# Patient Record
Sex: MALE | Race: NATIVE HAWAIIAN OR OTHER PACIFIC ISLANDER | NOT HISPANIC OR LATINO | URBAN - METROPOLITAN AREA
[De-identification: names, ages, dates, MRNs, and addresses within clinical notes are randomized per-mention and may not be internally consistent; named-entity substitution may affect disease eponyms.]

---

## 2024-08-09 ENCOUNTER — HOSPITAL ENCOUNTER (EMERGENCY)
Facility: HOSPITAL | Age: 44
Discharge: HOME | End: 2024-08-09
Attending: EMERGENCY MEDICINE

## 2024-08-09 VITALS
WEIGHT: 127.88 LBS | DIASTOLIC BLOOD PRESSURE: 70 MMHG | TEMPERATURE: 98 F | RESPIRATION RATE: 16 BRPM | OXYGEN SATURATION: 99 % | HEIGHT: 67 IN | SYSTOLIC BLOOD PRESSURE: 132 MMHG | BODY MASS INDEX: 20.07 KG/M2 | HEART RATE: 69 BPM

## 2024-08-09 DIAGNOSIS — R10.9 ABDOMINAL PAIN: ICD-10-CM

## 2024-08-09 DIAGNOSIS — K59.00 CONSTIPATION, UNSPECIFIED CONSTIPATION TYPE: Primary | ICD-10-CM

## 2024-08-09 LAB
ALBUMIN SERPL BCP-MCNC: 4 G/DL (ref 3.5–5.2)
ALP SERPL-CCNC: 54 U/L (ref 55–135)
ALT SERPL W/O P-5'-P-CCNC: 18 U/L (ref 10–44)
ANION GAP SERPL CALC-SCNC: 6 MMOL/L (ref 8–16)
AST SERPL-CCNC: 17 U/L (ref 10–40)
BASOPHILS # BLD AUTO: 0.06 K/UL (ref 0–0.2)
BASOPHILS NFR BLD: 0.8 % (ref 0–1.9)
BILIRUB SERPL-MCNC: 0.3 MG/DL (ref 0.1–1)
BILIRUB UR QL STRIP: NEGATIVE
BUN SERPL-MCNC: 13 MG/DL (ref 6–20)
CALCIUM SERPL-MCNC: 9.3 MG/DL (ref 8.7–10.5)
CHLORIDE SERPL-SCNC: 105 MMOL/L (ref 95–110)
CLARITY UR REFRACT.AUTO: CLEAR
CO2 SERPL-SCNC: 26 MMOL/L (ref 23–29)
COLOR UR AUTO: COLORLESS
CREAT SERPL-MCNC: 1.1 MG/DL (ref 0.5–1.4)
DIFFERENTIAL METHOD BLD: NORMAL
EOSINOPHIL # BLD AUTO: 0.1 K/UL (ref 0–0.5)
EOSINOPHIL NFR BLD: 1.7 % (ref 0–8)
ERYTHROCYTE [DISTWIDTH] IN BLOOD BY AUTOMATED COUNT: 12.7 % (ref 11.5–14.5)
EST. GFR  (NO RACE VARIABLE): >60 ML/MIN/1.73 M^2
GLUCOSE SERPL-MCNC: 96 MG/DL (ref 70–110)
GLUCOSE UR QL STRIP: NEGATIVE
HCT VFR BLD AUTO: 44.5 % (ref 40–54)
HCV AB SERPL QL IA: NORMAL
HGB BLD-MCNC: 14.4 G/DL (ref 14–18)
HGB UR QL STRIP: NEGATIVE
HIV 1+2 AB+HIV1 P24 AG SERPL QL IA: NORMAL
IMM GRANULOCYTES # BLD AUTO: 0.01 K/UL (ref 0–0.04)
IMM GRANULOCYTES NFR BLD AUTO: 0.1 % (ref 0–0.5)
KETONES UR QL STRIP: NEGATIVE
LEUKOCYTE ESTERASE UR QL STRIP: NEGATIVE
LIPASE SERPL-CCNC: 27 U/L (ref 4–60)
LYMPHOCYTES # BLD AUTO: 3 K/UL (ref 1–4.8)
LYMPHOCYTES NFR BLD: 40 % (ref 18–48)
MCH RBC QN AUTO: 28.4 PG (ref 27–31)
MCHC RBC AUTO-ENTMCNC: 32.4 G/DL (ref 32–36)
MCV RBC AUTO: 88 FL (ref 82–98)
MONOCYTES # BLD AUTO: 0.6 K/UL (ref 0.3–1)
MONOCYTES NFR BLD: 7.8 % (ref 4–15)
NEUTROPHILS # BLD AUTO: 3.7 K/UL (ref 1.8–7.7)
NEUTROPHILS NFR BLD: 49.6 % (ref 38–73)
NITRITE UR QL STRIP: NEGATIVE
NRBC BLD-RTO: 0 /100 WBC
PH UR STRIP: 7 [PH] (ref 5–8)
PLATELET # BLD AUTO: 283 K/UL (ref 150–450)
PMV BLD AUTO: 9.2 FL (ref 9.2–12.9)
POTASSIUM SERPL-SCNC: 4.5 MMOL/L (ref 3.5–5.1)
PROT SERPL-MCNC: 6.9 G/DL (ref 6–8.4)
PROT UR QL STRIP: NEGATIVE
RBC # BLD AUTO: 5.07 M/UL (ref 4.6–6.2)
SODIUM SERPL-SCNC: 137 MMOL/L (ref 136–145)
SP GR UR STRIP: >1.03 (ref 1–1.03)
URN SPEC COLLECT METH UR: ABNORMAL
WBC # BLD AUTO: 7.53 K/UL (ref 3.9–12.7)

## 2024-08-09 PROCEDURE — 96374 THER/PROPH/DIAG INJ IV PUSH: CPT

## 2024-08-09 PROCEDURE — 96361 HYDRATE IV INFUSION ADD-ON: CPT

## 2024-08-09 PROCEDURE — 81003 URINALYSIS AUTO W/O SCOPE: CPT | Performed by: STUDENT IN AN ORGANIZED HEALTH CARE EDUCATION/TRAINING PROGRAM

## 2024-08-09 PROCEDURE — 83690 ASSAY OF LIPASE: CPT | Performed by: STUDENT IN AN ORGANIZED HEALTH CARE EDUCATION/TRAINING PROGRAM

## 2024-08-09 PROCEDURE — 87389 HIV-1 AG W/HIV-1&-2 AB AG IA: CPT | Performed by: PHYSICIAN ASSISTANT

## 2024-08-09 PROCEDURE — 99285 EMERGENCY DEPT VISIT HI MDM: CPT | Mod: 25

## 2024-08-09 PROCEDURE — 80053 COMPREHEN METABOLIC PANEL: CPT | Performed by: STUDENT IN AN ORGANIZED HEALTH CARE EDUCATION/TRAINING PROGRAM

## 2024-08-09 PROCEDURE — 25500020 PHARM REV CODE 255: Performed by: EMERGENCY MEDICINE

## 2024-08-09 PROCEDURE — 63600175 PHARM REV CODE 636 W HCPCS: Performed by: EMERGENCY MEDICINE

## 2024-08-09 PROCEDURE — 85025 COMPLETE CBC W/AUTO DIFF WBC: CPT | Performed by: STUDENT IN AN ORGANIZED HEALTH CARE EDUCATION/TRAINING PROGRAM

## 2024-08-09 PROCEDURE — 93010 ELECTROCARDIOGRAM REPORT: CPT | Mod: ,,, | Performed by: INTERNAL MEDICINE

## 2024-08-09 PROCEDURE — 86803 HEPATITIS C AB TEST: CPT | Performed by: PHYSICIAN ASSISTANT

## 2024-08-09 PROCEDURE — 93005 ELECTROCARDIOGRAM TRACING: CPT

## 2024-08-09 RX ORDER — KETOROLAC TROMETHAMINE 30 MG/ML
10 INJECTION, SOLUTION INTRAMUSCULAR; INTRAVENOUS
Status: COMPLETED | OUTPATIENT
Start: 2024-08-09 | End: 2024-08-09

## 2024-08-09 RX ORDER — POLYETHYLENE GLYCOL 3350 17 G/17G
17 POWDER, FOR SOLUTION ORAL 2 TIMES DAILY PRN
Qty: 20 EACH | Refills: 0 | Status: SHIPPED | OUTPATIENT
Start: 2024-08-09 | End: 2024-08-19

## 2024-08-09 RX ADMIN — KETOROLAC TROMETHAMINE 10 MG: 30 INJECTION, SOLUTION INTRAMUSCULAR at 06:08

## 2024-08-09 RX ADMIN — SODIUM CHLORIDE, POTASSIUM CHLORIDE, SODIUM LACTATE AND CALCIUM CHLORIDE 1000 ML: 600; 310; 30; 20 INJECTION, SOLUTION INTRAVENOUS at 06:08

## 2024-08-09 RX ADMIN — IOHEXOL 75 ML: 350 INJECTION, SOLUTION INTRAVENOUS at 06:08

## 2024-08-09 NOTE — FIRST PROVIDER EVALUATION
"Medical screening examination initiated.  I have conducted a focused provider triage encounter, findings are as follows:    Brief history of present illness:  L upper abdominal pain x 20d, Reports no vomiting or nausea. States pain is getting more severe.     Vitals:    08/09/24 1451   Pulse: 63   Resp: 18   Temp: 98.5 °F (36.9 °C)   TempSrc: Oral   SpO2: 99%   Weight: 58 kg (127 lb 13.9 oz)   Height: 5' 6.54" (1.69 m)       Pertinent physical exam:  Ambulatory, no acute distress    Brief workup plan:  ECG, labs    Preliminary workup initiated; this workup will be continued and followed by the physician or advanced practice provider that is assigned to the patient when roomed.  "

## 2024-08-09 NOTE — ED PROVIDER NOTES
Encounter Date: 8/9/2024       History     Chief Complaint   Patient presents with    Abdominal Pain     L upper abd denies nausea and vomiting     Elia James 43-year-old male with no past medical history presenting today with abdominal pain.  Symptoms started 22 days ago.  Initially it occurred after food.  It is located in the left mid abdomen and radiates to the back.  For the past 20 days it has been constant.  It is not associated with nausea, vomiting or diarrhea.  He has had no fevers or chills.  He works on [employer] and was sent in for medical clearance [place] with access to minimal medical care.  Patient has no other complaints at this time.  Denies hematuria, chest pain or shortness of breath.      Review of patient's allergies indicates:  No Known Allergies  History reviewed. No pertinent past medical history.  History reviewed. No pertinent surgical history.  No family history on file.  Social History     Tobacco Use    Smoking status: Never    Smokeless tobacco: Never     Review of Systems    Physical Exam     Initial Vitals [08/09/24 1451]   BP Pulse Resp Temp SpO2   118/60 63 18 98.5 °F (36.9 °C) 99 %      MAP       --         Physical Exam    Nursing note and vitals reviewed.  Constitutional: He appears well-developed and well-nourished. No distress.   HENT:   Mouth/Throat: Oropharynx is clear and moist.   Eyes: Conjunctivae are normal.   Neck: Neck supple.   Cardiovascular:  Normal rate, regular rhythm and intact distal pulses.           Pulmonary/Chest: Breath sounds normal. He has no wheezes. He has no rales.   Abdominal: Abdomen is soft. Bowel sounds are normal. There is no abdominal tenderness.   Musculoskeletal:         General: No edema.      Cervical back: Neck supple.     Lymphadenopathy:     He has no cervical adenopathy.   Neurological: He is alert and oriented to person, place, and time.   Skin: No rash noted.   Psychiatric: He  has a normal mood and affect.         ED Course   Procedures  Labs Reviewed   COMPREHENSIVE METABOLIC PANEL - Abnormal       Result Value    Sodium 137      Potassium 4.5      Chloride 105      CO2 26      Glucose 96      BUN 13      Creatinine 1.1      Calcium 9.3      Total Protein 6.9      Albumin 4.0      Total Bilirubin 0.3      Alkaline Phosphatase 54 (*)     AST 17      ALT 18      eGFR >60.0      Anion Gap 6 (*)     Narrative:     Release to patient->Immediate   CBC W/ AUTO DIFFERENTIAL    WBC 7.53      RBC 5.07      Hemoglobin 14.4      Hematocrit 44.5      MCV 88      MCH 28.4      MCHC 32.4      RDW 12.7      Platelets 283      MPV 9.2      Immature Granulocytes 0.1      Gran # (ANC) 3.7      Immature Grans (Abs) 0.01      Lymph # 3.0      Mono # 0.6      Eos # 0.1      Baso # 0.06      nRBC 0      Gran % 49.6      Lymph % 40.0      Mono % 7.8      Eosinophil % 1.7      Basophil % 0.8      Differential Method Automated      Narrative:     Release to patient->Immediate   LIPASE    Lipase 27      Narrative:     Release to patient->Immediate   HIV 1 / 2 ANTIBODY    HIV 1/2 Ag/Ab Non-reactive      Narrative:     Release to patient->Immediate   HEPATITIS C ANTIBODY    Hepatitis C Ab Non-reactive      Narrative:     Release to patient->Immediate   URINALYSIS, REFLEX TO URINE CULTURE     EKG Readings: (Independently Interpreted)   Initial Reading: No STEMI.   EKG:  Sinus bradycardia at 48 with a sinus arrhythmia, no high-grade heart block. QRS 98,        Imaging Results              CT Abdomen Pelvis With IV Contrast NO Oral Contrast (Final result)  Result time 08/09/24 20:18:41      Final result by Nick Bradley MD (08/09/24 20:18:41)                   Impression:      1. No acute abnormality  2. Possible constipation.      Electronically signed by: Nick Bradley  Date:    08/09/2024  Time:    20:18               Narrative:    EXAMINATION:  CT ABDOMEN PELVIS WITH IV CONTRAST    CLINICAL HISTORY:  LLQ  abdominal pain;    TECHNIQUE:  Low dose axial images, sagittal and coronal reformations were obtained from the lung bases to the pubic symphysis following the IV administration of 75 mL of Omnipaque 350 .  Oral contrast was not administered.    COMPARISON:  None.    FINDINGS:  Abdomen:    - Lower thorax:    - Lung bases: No infiltrates and no nodules.    - Liver: No focal mass.    - Gallbladder: No calcified gallstones.    - Bile Ducts: No evidence of intra or extra hepatic biliary ductal dilation.    - Spleen: Negative.    - Kidneys: No stone, mass or hydronephrosis.    - Adrenals: Unremarkable.    - Pancreas: No mass or peripancreatic fat stranding.    - Retroperitoneum:  No significant adenopathy.    - Vascular: No abdominal aortic aneurysm.    - Abdominal wall:  Unremarkable.    The appendix is within normal limits.    Pelvis:    No pelvic mass, adenopathy, or free fluid.    Bowel/Mesentery:    No evidence of bowel obstruction or inflammation.  Moderate retained feces in the colon.    Bones:  No acute osseous abnormality and no suspicious lytic or blastic lesion.                                       Medications   lactated ringers bolus 1,000 mL (0 mLs Intravenous Stopped 8/9/24 1941)   ketorolac injection 9.999 mg (9.999 mg Intravenous Given 8/9/24 1838)   iohexoL (OMNIPAQUE 350) injection 75 mL (75 mLs Intravenous Given 8/9/24 1840)     Medical Decision Making  Emergent evaluation a 43-year-old male presenting today for prolonged abdominal pain.    Vital signs currently stable.  Overall well-appearing, no acute distress.    Differential includes but not limited to enteritis, diverticulitis, pancreatitis, cholecystitis, hepatitis, gastritis, colitis, nephrolithiasis.  Low suspicion for perforation given the length of his symptoms.    Plan:  Labs  EKG  CT abdomen pelvis  Pain control, IV fluids    Amount and/or Complexity of Data Reviewed  Labs: ordered. Decision-making details documented in ED  Course.  Radiology: ordered and independent interpretation performed.  ECG/medicine tests: ordered and independent interpretation performed.    Risk  OTC drugs.  Prescription drug management.               ED Course as of 08/09/24 2041   Fri Aug 09, 2024   1933 Patient has given consent and has asked me to speak to Laura Roula- nurse manager for [facility] regarding his workup.    I have given her an update, pending CT scan and UA- I anticipate discharge [GM]   2039 CT scan negative for acute intra-abdominal process.  Possible constipation.  I have prescribed MiraLax.    I have discussed results with patient.  Recommend continue MiraLax 2 times a day until good bowel movements.  I filled out his paperwork to returned the vessel.    Nurse contacted coordinator, faxed papers as instructed with patients permission.    [GM]      ED Course User Index  [GM] Fina Richards MD                           Clinical Impression:  Final diagnoses:  [R10.9] Abdominal pain  [K59.00] Constipation, unspecified constipation type (Primary)          ED Disposition Condition    Discharge Stable          ED Prescriptions       Medication Sig Dispense Start Date End Date Auth. Provider    polyethylene glycol (GLYCOLAX) 17 gram PwPk Take 17 g by mouth 2 (two) times daily as needed for Constipation. 20 each 8/9/2024 8/19/2024 Fina Richards MD          Follow-up Information       Follow up With Specialties Details Why Contact Info    [facility] Emergency [facility]   [address]  [facility] [address]  [phone]             Fina Richards MD  08/09/24 2041

## 2024-08-10 LAB
OHS QRS DURATION: 98 MS
OHS QTC CALCULATION: 355 MS

## 2024-08-10 NOTE — DISCHARGE INSTRUCTIONS
Your workup today is very reassuring.  Your labs are stable.  Your CT of your abdomen pelvis did not show any acute intra-abdominal emergency.  It does appear that you are constipated.  Please start MiraLax and take it 2 times a day as needed for good bowel movement.  You can continue Tylenol or ibuprofen as needed.  Return to emergency department for any concerning symptom.  Follow-up with your [facility] doctor in 3 days for re-evaluation.

## 2024-08-10 NOTE — ED TRIAGE NOTES
Elia James, a 43 y.o. male presents to the ED w/ complaint of abdominal pain, pt c/o left mid abdomen pain radiating to back, denies n/v/d    Triage note:  Chief Complaint   Patient presents with    Abdominal Pain     L upper abd denies nausea and vomiting     Review of patient's allergies indicates:  No Known Allergies  No past medical history on file.